# Patient Record
Sex: FEMALE | Race: WHITE | NOT HISPANIC OR LATINO
[De-identification: names, ages, dates, MRNs, and addresses within clinical notes are randomized per-mention and may not be internally consistent; named-entity substitution may affect disease eponyms.]

---

## 2017-08-09 ENCOUNTER — NON-APPOINTMENT (OUTPATIENT)
Age: 72
End: 2017-08-09

## 2017-08-09 ENCOUNTER — APPOINTMENT (OUTPATIENT)
Dept: CARDIOLOGY | Facility: CLINIC | Age: 72
End: 2017-08-09
Payer: MEDICARE

## 2017-08-09 VITALS
SYSTOLIC BLOOD PRESSURE: 136 MMHG | HEART RATE: 88 BPM | DIASTOLIC BLOOD PRESSURE: 83 MMHG | WEIGHT: 123 LBS | BODY MASS INDEX: 23.22 KG/M2 | OXYGEN SATURATION: 99 % | HEIGHT: 61 IN

## 2017-08-09 DIAGNOSIS — R06.89 OTHER ABNORMALITIES OF BREATHING: ICD-10-CM

## 2017-08-09 PROCEDURE — 93000 ELECTROCARDIOGRAM COMPLETE: CPT

## 2017-08-09 PROCEDURE — 99214 OFFICE O/P EST MOD 30 MIN: CPT

## 2017-08-14 ENCOUNTER — TRANSCRIPTION ENCOUNTER (OUTPATIENT)
Age: 72
End: 2017-08-14

## 2018-06-17 ENCOUNTER — TRANSCRIPTION ENCOUNTER (OUTPATIENT)
Age: 73
End: 2018-06-17

## 2018-06-17 ENCOUNTER — EMERGENCY (EMERGENCY)
Facility: HOSPITAL | Age: 73
LOS: 1 days | Discharge: HOME | End: 2018-06-17
Attending: EMERGENCY MEDICINE
Payer: MEDICARE

## 2018-06-17 VITALS
HEART RATE: 106 BPM | TEMPERATURE: 98 F | SYSTOLIC BLOOD PRESSURE: 160 MMHG | OXYGEN SATURATION: 100 % | HEIGHT: 60 IN | DIASTOLIC BLOOD PRESSURE: 89 MMHG | RESPIRATION RATE: 16 BRPM | WEIGHT: 119.93 LBS

## 2018-06-17 VITALS
HEART RATE: 90 BPM | RESPIRATION RATE: 16 BRPM | OXYGEN SATURATION: 100 % | TEMPERATURE: 97 F | SYSTOLIC BLOOD PRESSURE: 156 MMHG | DIASTOLIC BLOOD PRESSURE: 77 MMHG

## 2018-06-17 LAB
ALBUMIN SERPL ELPH-MCNC: 3.9 G/DL — SIGNIFICANT CHANGE UP (ref 3.3–5)
ALP SERPL-CCNC: 58 U/L — SIGNIFICANT CHANGE UP (ref 40–120)
ALT FLD-CCNC: 26 U/L — SIGNIFICANT CHANGE UP (ref 12–78)
ANION GAP SERPL CALC-SCNC: 6 MMOL/L — SIGNIFICANT CHANGE UP (ref 5–17)
APPEARANCE UR: ABNORMAL
AST SERPL-CCNC: 25 U/L — SIGNIFICANT CHANGE UP (ref 15–37)
BASOPHILS # BLD AUTO: 0.02 K/UL — SIGNIFICANT CHANGE UP (ref 0–0.2)
BASOPHILS NFR BLD AUTO: 0.3 % — SIGNIFICANT CHANGE UP (ref 0–2)
BILIRUB SERPL-MCNC: 0.6 MG/DL — SIGNIFICANT CHANGE UP (ref 0.2–1.2)
BILIRUB UR-MCNC: NEGATIVE — SIGNIFICANT CHANGE UP
BUN SERPL-MCNC: 16 MG/DL — SIGNIFICANT CHANGE UP (ref 7–23)
CALCIUM SERPL-MCNC: 9.9 MG/DL — SIGNIFICANT CHANGE UP (ref 8.5–10.1)
CHLORIDE SERPL-SCNC: 100 MMOL/L — SIGNIFICANT CHANGE UP (ref 96–108)
CO2 SERPL-SCNC: 29 MMOL/L — SIGNIFICANT CHANGE UP (ref 22–31)
COLOR SPEC: YELLOW — SIGNIFICANT CHANGE UP
CREAT SERPL-MCNC: 0.82 MG/DL — SIGNIFICANT CHANGE UP (ref 0.5–1.3)
DIFF PNL FLD: ABNORMAL
EOSINOPHIL # BLD AUTO: 0.04 K/UL — SIGNIFICANT CHANGE UP (ref 0–0.5)
EOSINOPHIL NFR BLD AUTO: 0.5 % — SIGNIFICANT CHANGE UP (ref 0–6)
GLUCOSE SERPL-MCNC: 126 MG/DL — HIGH (ref 70–99)
GLUCOSE UR QL: 50 MG/DL
HCT VFR BLD CALC: 37.4 % — SIGNIFICANT CHANGE UP (ref 34.5–45)
HGB BLD-MCNC: 13.2 G/DL — SIGNIFICANT CHANGE UP (ref 11.5–15.5)
IMM GRANULOCYTES NFR BLD AUTO: 0.3 % — SIGNIFICANT CHANGE UP (ref 0–1.5)
KETONES UR-MCNC: ABNORMAL
LEUKOCYTE ESTERASE UR-ACNC: ABNORMAL
LYMPHOCYTES # BLD AUTO: 1.4 K/UL — SIGNIFICANT CHANGE UP (ref 1–3.3)
LYMPHOCYTES # BLD AUTO: 18.5 % — SIGNIFICANT CHANGE UP (ref 13–44)
MCHC RBC-ENTMCNC: 32.2 PG — SIGNIFICANT CHANGE UP (ref 27–34)
MCHC RBC-ENTMCNC: 35.3 GM/DL — SIGNIFICANT CHANGE UP (ref 32–36)
MCV RBC AUTO: 91.2 FL — SIGNIFICANT CHANGE UP (ref 80–100)
MONOCYTES # BLD AUTO: 0.47 K/UL — SIGNIFICANT CHANGE UP (ref 0–0.9)
MONOCYTES NFR BLD AUTO: 6.2 % — SIGNIFICANT CHANGE UP (ref 2–14)
NEUTROPHILS # BLD AUTO: 5.63 K/UL — SIGNIFICANT CHANGE UP (ref 1.8–7.4)
NEUTROPHILS NFR BLD AUTO: 74.2 % — SIGNIFICANT CHANGE UP (ref 43–77)
NITRITE UR-MCNC: NEGATIVE — SIGNIFICANT CHANGE UP
PH UR: 6 — SIGNIFICANT CHANGE UP (ref 5–8)
PLATELET # BLD AUTO: 230 K/UL — SIGNIFICANT CHANGE UP (ref 150–400)
POTASSIUM SERPL-MCNC: 4.3 MMOL/L — SIGNIFICANT CHANGE UP (ref 3.5–5.3)
POTASSIUM SERPL-SCNC: 4.3 MMOL/L — SIGNIFICANT CHANGE UP (ref 3.5–5.3)
PROT SERPL-MCNC: 7.9 G/DL — SIGNIFICANT CHANGE UP (ref 6–8.3)
PROT UR-MCNC: 25 MG/DL
RBC # BLD: 4.1 M/UL — SIGNIFICANT CHANGE UP (ref 3.8–5.2)
RBC # FLD: 13.1 % — SIGNIFICANT CHANGE UP (ref 10.3–14.5)
SODIUM SERPL-SCNC: 135 MMOL/L — SIGNIFICANT CHANGE UP (ref 135–145)
SP GR SPEC: 1.02 — SIGNIFICANT CHANGE UP (ref 1.01–1.02)
TROPONIN I SERPL-MCNC: <.015 NG/ML — SIGNIFICANT CHANGE UP (ref 0.01–0.04)
UROBILINOGEN FLD QL: NEGATIVE — SIGNIFICANT CHANGE UP
WBC # BLD: 7.58 K/UL — SIGNIFICANT CHANGE UP (ref 3.8–10.5)
WBC # FLD AUTO: 7.58 K/UL — SIGNIFICANT CHANGE UP (ref 3.8–10.5)

## 2018-06-17 PROCEDURE — 70450 CT HEAD/BRAIN W/O DYE: CPT | Mod: 26

## 2018-06-17 PROCEDURE — 93010 ELECTROCARDIOGRAM REPORT: CPT

## 2018-06-17 PROCEDURE — 81001 URINALYSIS AUTO W/SCOPE: CPT

## 2018-06-17 PROCEDURE — 70450 CT HEAD/BRAIN W/O DYE: CPT

## 2018-06-17 PROCEDURE — 99285 EMERGENCY DEPT VISIT HI MDM: CPT

## 2018-06-17 PROCEDURE — 99284 EMERGENCY DEPT VISIT MOD MDM: CPT | Mod: 25

## 2018-06-17 PROCEDURE — 84484 ASSAY OF TROPONIN QUANT: CPT

## 2018-06-17 PROCEDURE — 93005 ELECTROCARDIOGRAM TRACING: CPT

## 2018-06-17 PROCEDURE — 85027 COMPLETE CBC AUTOMATED: CPT

## 2018-06-17 PROCEDURE — 80053 COMPREHEN METABOLIC PANEL: CPT

## 2018-06-17 RX ORDER — CEFUROXIME AXETIL 250 MG
500 TABLET ORAL ONCE
Qty: 0 | Refills: 0 | Status: COMPLETED | OUTPATIENT
Start: 2018-06-17 | End: 2018-06-17

## 2018-06-17 RX ORDER — OMEGA-3 ACID ETHYL ESTERS 1 G
0 CAPSULE ORAL
Qty: 0 | Refills: 0 | COMMUNITY

## 2018-06-17 RX ORDER — LEVOTHYROXINE SODIUM 125 MCG
1 TABLET ORAL
Qty: 0 | Refills: 0 | COMMUNITY

## 2018-06-17 RX ORDER — MECLIZINE HCL 12.5 MG
25 TABLET ORAL ONCE
Qty: 0 | Refills: 0 | Status: COMPLETED | OUTPATIENT
Start: 2018-06-17 | End: 2018-06-17

## 2018-06-17 RX ORDER — RAMIPRIL 5 MG
1 CAPSULE ORAL
Qty: 0 | Refills: 0 | COMMUNITY

## 2018-06-17 RX ORDER — SIMVASTATIN 20 MG/1
1 TABLET, FILM COATED ORAL
Qty: 0 | Refills: 0 | COMMUNITY

## 2018-06-17 RX ADMIN — Medication 500 MILLIGRAM(S): at 15:33

## 2018-06-17 RX ADMIN — Medication 25 MILLIGRAM(S): at 13:22

## 2018-06-17 NOTE — ED ADULT NURSE NOTE - OBJECTIVE STATEMENT
received pt in bed #13a Pt A&O states she woke up yesterday feeling dizzy & nauseous states intensity is intermittent but is constant. Seen by urgent care today & sent to the ED

## 2018-06-17 NOTE — ED PROVIDER NOTE - CARE PLAN
Principal Discharge DX:	Dizziness Principal Discharge DX:	Dizziness  Secondary Diagnosis:	Acute cystitis without hematuria  Secondary Diagnosis:	Acute non-recurrent frontal sinusitis

## 2018-06-17 NOTE — ED PROVIDER NOTE - OBJECTIVE STATEMENT
71 y/o F insulin dependant diabetic with c/o 2 days of dizziness and nausea.  Pt states she feels as if she is on a boat.  Pt was seen in the urgent care and had ekg and told to go to the ED for a CT.  Pt had recent viral illness with continued ear pressure.  Pt denies fevers, chills, chest pain or SOB.

## 2018-06-17 NOTE — ED ADULT NURSE NOTE - CHPI ED SYMPTOMS NEG
no numbness/no vomiting/no confusion/no weakness/no change in level of consciousness/no fever/no loss of consciousness

## 2018-06-17 NOTE — ED ADULT NURSE NOTE - PMH
Acquired deformities of toe, right    Anxiety    Breast lump  right - benign  Constipation    Diastolic dysfunction    Granuloma of skin  nose  HTN (hypertension)    Hyperlipidemia    Hypothyroidism    T1DM (type 1 diabetes mellitus)  dx 19 yrs ago ( 1995 ). Humalog  infusion

## 2018-06-18 RX ORDER — CEFUROXIME AXETIL 250 MG
1 TABLET ORAL
Qty: 20 | Refills: 0 | OUTPATIENT
Start: 2018-06-18 | End: 2018-06-27

## 2018-08-06 ENCOUNTER — APPOINTMENT (OUTPATIENT)
Dept: CARDIOLOGY | Facility: CLINIC | Age: 73
End: 2018-08-06
Payer: MEDICARE

## 2018-08-06 ENCOUNTER — NON-APPOINTMENT (OUTPATIENT)
Age: 73
End: 2018-08-06

## 2018-08-06 VITALS
BODY MASS INDEX: 22.84 KG/M2 | WEIGHT: 121 LBS | OXYGEN SATURATION: 100 % | HEART RATE: 86 BPM | DIASTOLIC BLOOD PRESSURE: 79 MMHG | SYSTOLIC BLOOD PRESSURE: 146 MMHG | HEIGHT: 61 IN

## 2018-08-06 PROCEDURE — 93000 ELECTROCARDIOGRAM COMPLETE: CPT

## 2018-08-06 PROCEDURE — 99214 OFFICE O/P EST MOD 30 MIN: CPT

## 2018-08-07 ENCOUNTER — APPOINTMENT (OUTPATIENT)
Dept: CARDIOLOGY | Facility: CLINIC | Age: 73
End: 2018-08-07
Payer: MEDICARE

## 2018-08-07 PROCEDURE — 93224 XTRNL ECG REC UP TO 48 HRS: CPT

## 2018-09-16 ENCOUNTER — TRANSCRIPTION ENCOUNTER (OUTPATIENT)
Age: 73
End: 2018-09-16

## 2019-03-15 NOTE — ED PROVIDER NOTE - ASSOCIATED PAIN OR INJURY
DISCHARGE INSTRUCTIONS:    FOLLOW UP WITH YOUR PRIMARY CARE PROVIDER OR THE [address] [place] [address]  MAKE AN APPOINTMENT TO BE SEEN  TAKE TYLENOL OR MOTRIN FOR PAIN  TAKE MACROBID AS PRESCRIBED FOR UTI  IF RASH, SHORTNESS OF BREATH OR TROUBLE SWALLOWING, STOP TAKING THE MEDICATION AND BE SEEN  TAKE PYRIDIUM AS PRESCRIBED FOR URINARY SYMPTOMS  IF RASH, SHORTNESS OF BREATH OR TROUBLE SWALLOWING, STOP TAKING THE MEDICATION AND BE SEEN  REST AND DRINK PLENTY OF FLUIDS  IF SYMPTOMS WORSEN OR NEW SYMPTOMS ARISE, RETURN TO THE ER TO BE SEEN 
no discrete location documentation necessary

## 2019-06-25 ENCOUNTER — NON-APPOINTMENT (OUTPATIENT)
Age: 74
End: 2019-06-25

## 2019-06-25 ENCOUNTER — APPOINTMENT (OUTPATIENT)
Dept: CARDIOLOGY | Facility: CLINIC | Age: 74
End: 2019-06-25
Payer: MEDICARE

## 2019-06-25 VITALS
DIASTOLIC BLOOD PRESSURE: 74 MMHG | SYSTOLIC BLOOD PRESSURE: 134 MMHG | WEIGHT: 121 LBS | HEART RATE: 96 BPM | BODY MASS INDEX: 22.84 KG/M2 | OXYGEN SATURATION: 97 % | HEIGHT: 61 IN

## 2019-06-25 DIAGNOSIS — R00.2 PALPITATIONS: ICD-10-CM

## 2019-06-25 DIAGNOSIS — E78.00 PURE HYPERCHOLESTEROLEMIA, UNSPECIFIED: ICD-10-CM

## 2019-06-25 DIAGNOSIS — E11.9 TYPE 2 DIABETES MELLITUS W/OUT COMPLICATIONS: ICD-10-CM

## 2019-06-25 DIAGNOSIS — I65.29 OCCLUSION AND STENOSIS OF UNSPECIFIED CAROTID ARTERY: ICD-10-CM

## 2019-06-25 PROCEDURE — 93000 ELECTROCARDIOGRAM COMPLETE: CPT

## 2019-06-25 PROCEDURE — 99214 OFFICE O/P EST MOD 30 MIN: CPT

## 2019-06-25 NOTE — DISCUSSION/SUMMARY
[FreeTextEntry1] : The patient is feeling well without any signs or symptoms of active heart disease. She walks a regular basis and has no exertional symptoms. When she does go up and down stairs she gets some shortness of breath but this is a chronic and stable symptom.\par \par She'll remain on her present medication. She is  moving to [place] in October. We will make copies of her records. If she is any problem she certainly could call us.

## 2019-06-25 NOTE — REASON FOR VISIT
[Follow-Up - Clinic] : a clinic follow-up of [Carotid Artery Stenosis] : carotid stenosis [Dyspnea] : dyspnea [Hyperlipidemia] : hyperlipidemia [FreeTextEntry1] : Diastolic dysfunction, aortic sclerosis

## 2019-06-25 NOTE — REVIEW OF SYSTEMS
[Dyspnea on exertion] : dyspnea during exertion [Change In The Stool] : change in stool [Joint Pain] : joint pain [Tingling (Paresthesia)] : tingling [Depression] : depression [Negative] : Genitourinary [Fever] : no fever [Headache] : no headache [Feeling Fatigued] : not feeling fatigued [Chills] : no chills [Earache] : no earache [Seeing Double (Diplopia)] : no diplopia [Blurry Vision] : no blurred vision [Sore Throat] : no sore throat [Sinus Pressure] : no sinus pressure [Chest Pain] : no chest pain [Lower Ext Edema] : no extremity edema [Shortness Of Breath] : no shortness of breath [Palpitations] : no palpitations [Skin Lesions] : no skin lesions [Skin: A Rash] : no rash: [Tremor] : no tremor was seen [Convulsions] : no convulsions [Dizziness] : no dizziness [Confusion] : no confusion was observed [Easy Bleeding] : no tendency for easy bleeding [Excessive Thirst] : no polydipsia [Easy Bruising] : no tendency for easy bruising

## 2019-06-25 NOTE — PHYSICAL EXAM
[General Appearance - Well Developed] : well developed [Well Groomed] : well groomed [Normal Appearance] : normal appearance [No Deformities] : no deformities [General Appearance - Well Nourished] : well nourished [General Appearance - In No Acute Distress] : no acute distress [Normal Oral Mucosa] : normal oral mucosa [Normal Conjunctiva] : the conjunctiva exhibited no abnormalities [Normal Jugular Venous A Waves Present] : normal jugular venous A waves present [Normal Jugular Venous V Waves Present] : normal jugular venous V waves present [No Jugular Venous Muro A Waves] : no jugular venous muro A waves [Respiration, Rhythm And Depth] : normal respiratory rhythm and effort [Exaggerated Use Of Accessory Muscles For Inspiration] : no accessory muscle use [Auscultation Breath Sounds / Voice Sounds] : lungs were clear to auscultation bilaterally [Bowel Sounds] : normal bowel sounds [Abdomen Tenderness] : non-tender [Abdomen Soft] : soft [Abnormal Walk] : normal gait [Gait - Sufficient For Exercise Testing] : the gait was sufficient for exercise testing [Nail Clubbing] : no clubbing of the fingernails [Cyanosis, Localized] : no localized cyanosis [Skin Color & Pigmentation] : normal skin color and pigmentation [Skin Turgor] : normal skin turgor [] : no rash [Oriented To Time, Place, And Person] : oriented to person, place, and time [Impaired Insight] : insight and judgment were intact [No Anxiety] : not feeling anxious [Normal Rate] : normal [Normal S1] : normal S1 [Normal S2] : normal S2 [No Murmur] : no murmurs heard [2+] : left 2+ [No Abnormalities] : the abdominal aorta was not enlarged and no bruit was heard [No Pitting Edema] : no pitting edema present [S3] : no S3 [S4] : no S4 [Right Carotid Bruit] : no bruit heard over the right carotid [Right Femoral Bruit] : no bruit heard over the right femoral artery [Left Carotid Bruit] : no bruit heard over the left carotid [Left Femoral Bruit] : no bruit heard over the left femoral artery

## 2019-06-25 NOTE — CARDIOLOGY SUMMARY
[No Ischemia] : no Ischemia [LVEF ___%] : LVEF [unfilled]% [None] : no pulmonary hypertension [Normal] : normal [___] : [unfilled] [___] : [unfilled]

## 2019-06-25 NOTE — HISTORY OF PRESENT ILLNESS
[FreeTextEntry1] : I saw Silas Block in the office today for a followup visit. She is a 73year-old white female with diabetes and high cholesterol. She now is limited in exercise by back pain.  She tries to exercise regularly but she she tires more easily. She still has dyspnea on exertion which is a chronic symptom and probably due to diastolic dysfunction.\par \par She  did a nuclear stress test 7/11 that showed no ischemia at a work load of 10 METS. She had a stress echo 8/16 that showed no ischemia to work load 9 mets. The echo was normal except for mild MR and stage I diastolic dysfunction..A repeat echo performed 11/17 showed normal ejection fraction with stage I diastolic dysfunction.\par \par . Normal chemistries and CBC.. The patient had a calcium score and CT angiogram in 2006. The CT angiogram showed no obstruction the calcium score was zero. Patient had a carotid Doppler 7/18 that showed mild  plaque., without change.Labwork performed 6/19 demonstrated a total cholesterol of 189, triglycerides 52, HDL 1:15, LDL 64. A1c was 5.7\par \par A resting 12 electrocardiogram demonstrates sinus rhythm and remains normal.\par \par She had a Holter monitor 8/18 to evaluate symptoms of increased heart rate. The Holter monitor demonstrated heart rates between 52 and 125, with an average rate of 76. There were 6 APCs.\par \par \par